# Patient Record
Sex: FEMALE | Race: WHITE | NOT HISPANIC OR LATINO | Employment: FULL TIME | URBAN - METROPOLITAN AREA
[De-identification: names, ages, dates, MRNs, and addresses within clinical notes are randomized per-mention and may not be internally consistent; named-entity substitution may affect disease eponyms.]

---

## 2018-12-06 ENCOUNTER — AMBULATORY - HEALTHEAST (OUTPATIENT)
Dept: NURSING | Facility: CLINIC | Age: 33
End: 2018-12-06

## 2019-10-31 ENCOUNTER — AMBULATORY - HEALTHEAST (OUTPATIENT)
Dept: NURSING | Facility: CLINIC | Age: 34
End: 2019-10-31

## 2020-11-13 ENCOUNTER — OFFICE VISIT - HEALTHEAST (OUTPATIENT)
Dept: FAMILY MEDICINE | Facility: CLINIC | Age: 35
End: 2020-11-13

## 2020-11-13 DIAGNOSIS — Z00.00 ROUTINE GENERAL MEDICAL EXAMINATION AT A HEALTH CARE FACILITY: ICD-10-CM

## 2020-11-13 DIAGNOSIS — M67.432 GANGLION CYST OF WRIST, LEFT: ICD-10-CM

## 2020-11-13 DIAGNOSIS — Z13.9 SCREENING FOR CONDITION: ICD-10-CM

## 2020-11-13 DIAGNOSIS — E55.9 VITAMIN D DEFICIENCY: ICD-10-CM

## 2020-11-13 DIAGNOSIS — R68.82 LOW LIBIDO: ICD-10-CM

## 2020-11-13 DIAGNOSIS — Z30.09 GENERAL COUNSELING FOR PRESCRIPTION OF ORAL CONTRACEPTIVES: ICD-10-CM

## 2020-11-13 LAB
CHOLEST SERPL-MCNC: 223 MG/DL
FASTING STATUS PATIENT QL REPORTED: YES
FASTING STATUS PATIENT QL REPORTED: YES
GLUCOSE BLD-MCNC: 87 MG/DL (ref 70–125)
HDLC SERPL-MCNC: 65 MG/DL
LDLC SERPL CALC-MCNC: 144 MG/DL
TRIGL SERPL-MCNC: 70 MG/DL

## 2020-11-13 ASSESSMENT — MIFFLIN-ST. JEOR: SCORE: 1595.94

## 2020-11-16 ENCOUNTER — COMMUNICATION - HEALTHEAST (OUTPATIENT)
Dept: FAMILY MEDICINE | Facility: CLINIC | Age: 35
End: 2020-11-16

## 2020-11-16 LAB
25(OH)D3 SERPL-MCNC: 10 NG/ML (ref 30–80)
HPV SOURCE: NORMAL
HUMAN PAPILLOMA VIRUS 16 DNA: NEGATIVE
HUMAN PAPILLOMA VIRUS 18 DNA: NEGATIVE
HUMAN PAPILLOMA VIRUS FINAL DIAGNOSIS: NORMAL
HUMAN PAPILLOMA VIRUS OTHER HR: NEGATIVE
SPECIMEN DESCRIPTION: NORMAL

## 2020-11-20 LAB
BKR LAB AP ABNORMAL BLEEDING: NO
BKR LAB AP BIRTH CONTROL/HORMONES: NORMAL
BKR LAB AP CERVICAL APPEARANCE: NORMAL
BKR LAB AP GYN ADEQUACY: NORMAL
BKR LAB AP GYN INTERPRETATION: NORMAL
BKR LAB AP HPV REFLEX: NORMAL
BKR LAB AP LMP: NORMAL
BKR LAB AP PATIENT STATUS: NORMAL
BKR LAB AP PREVIOUS ABNORMAL: NO
BKR LAB AP PREVIOUS NORMAL: 2015
HIGH RISK?: NO
PATH REPORT.COMMENTS IMP SPEC: NORMAL
RESULT FLAG (HE HISTORICAL CONVERSION): NORMAL

## 2020-12-08 ENCOUNTER — RECORDS - HEALTHEAST (OUTPATIENT)
Dept: ADMINISTRATIVE | Facility: OTHER | Age: 35
End: 2020-12-08

## 2021-01-14 ENCOUNTER — COMMUNICATION - HEALTHEAST (OUTPATIENT)
Dept: SCHEDULING | Facility: CLINIC | Age: 36
End: 2021-01-14

## 2021-01-21 ENCOUNTER — OFFICE VISIT - HEALTHEAST (OUTPATIENT)
Dept: FAMILY MEDICINE | Facility: CLINIC | Age: 36
End: 2021-01-21

## 2021-01-21 DIAGNOSIS — Z01.818 PREOPERATIVE EXAMINATION: ICD-10-CM

## 2021-01-21 DIAGNOSIS — M67.432 GANGLION CYST OF WRIST, LEFT: ICD-10-CM

## 2021-01-21 LAB — HCG UR QL: NEGATIVE

## 2021-01-21 ASSESSMENT — MIFFLIN-ST. JEOR: SCORE: 1637.69

## 2021-01-22 ENCOUNTER — RECORDS - HEALTHEAST (OUTPATIENT)
Dept: ADMINISTRATIVE | Facility: OTHER | Age: 36
End: 2021-01-22

## 2021-02-02 ENCOUNTER — RECORDS - HEALTHEAST (OUTPATIENT)
Dept: ADMINISTRATIVE | Facility: OTHER | Age: 36
End: 2021-02-02

## 2021-03-02 ENCOUNTER — RECORDS - HEALTHEAST (OUTPATIENT)
Dept: ADMINISTRATIVE | Facility: OTHER | Age: 36
End: 2021-03-02

## 2021-06-05 VITALS
HEART RATE: 88 BPM | HEIGHT: 64 IN | SYSTOLIC BLOOD PRESSURE: 124 MMHG | DIASTOLIC BLOOD PRESSURE: 68 MMHG | WEIGHT: 201.7 LBS | BODY MASS INDEX: 34.43 KG/M2

## 2021-06-05 VITALS
SYSTOLIC BLOOD PRESSURE: 116 MMHG | DIASTOLIC BLOOD PRESSURE: 76 MMHG | BODY MASS INDEX: 35.99 KG/M2 | HEIGHT: 64 IN | WEIGHT: 210.8 LBS | HEART RATE: 72 BPM

## 2021-06-13 NOTE — PROGRESS NOTES
ASSESSMENT:  1. Routine general medical examination at a health care facility     - Gynecologic Cytology (PAP Smear)  - Lipid Cascade  - Glucose    2. Vitamin D deficiency     - Vitamin D, Total (25-Hydroxy)    3. Low libido       4. Ganglion cyst of wrist, left     - Ambulatory referral to Orthopedic Surgery    5. Screening for condition     - Gynecologic Cytology (PAP Smear)  - Lipid Cascade  - Glucose           PLAN:  There are no Patient Instructions on file for this visit.    Orders Placed This Encounter   Procedures     Influenza, Seasonal Quad, PF =/> 6months     Lipid Cascade     Order Specific Question:   Fasting is required?     Answer:   Yes     Glucose     Vitamin D, Total (25-Hydroxy)     Ambulatory referral to Orthopedic Surgery     Referral Priority:   Routine     Referral Type:   Surgical     Referral Reason:   Evaluation and Treatment     Referral Location:   [facility]     Requested Specialty:   [facility]     Number of Visits Requested:   1     Medications Discontinued During This Encounter   Medication Reason     DOCOSAHEXANOIC ACID/EPA (FISH OIL ORAL) Therapy completed     lanolin (LANSINOH HPA) 100 % Oint Therapy completed     PRENATAL VIT #91/FE FUM/FA/DHA (PRENATAL + DHA ORAL) Therapy completed     witch hazel (TUCKS) 12.5-50 % PadM Therapy completed     cholecalciferol, vitamin D3, 1,000 unit tablet Therapy completed       No follow-ups on file.    Health Maintenance Due   Topic Date Due     ADVANCE CARE PLANNING  04/11/2005     PAP SMEAR  08/16/2016       CHIEF COMPLAINT:  Chief Complaint   Patient presents with     Annual Exam     bump on wrist, birth control       HISTORY OF PRESENT ILLNESS:  Lauren Mckenzie is a 35 y.o. female presenting to the clinic today for a physical exam.     She has not had a complete exam since she delivered her child.  She is not using any hormonal birth control.  She and her  are using condoms and she would like  something different.  She also has a very low libido which is causing some marital strife.  She does not have anything else symptom wise it would make me think I need to do any other work-up for the libido.    She tells me that sometimes intercourse is painful and her  has a curvature to his erection but he is not willing to go discuss what he could possibly do about that to make it more comfortable for her.  She says that she sometimes also will have some pain in the area where she had her stitches to repair the tear that she experienced during childbirth.    They are considering going to marital counseling.    She is due for a Pap smear and a flu shot and some fasting labs.    We went through options for birth control because she was interested in a Mirena IUD.  She will think about that and if she decides she wants to get that she will try to schedule it when she is menstruating so that it is easier to place.    She also has a swelling on the volar aspect of the left wrist that sometimes gets in the way if she is carrying something for work.  I will send her to a hand surgeon because this is a ganglion cyst and she can talk through options for treatment    Healthy Habits  Are you taking a daily aspirin? No  Do you typically exercising at least 40 min, 3-4 times per week?  Yes  Do you usually eat at least 4 servings of fruit and vegetables a day, include whole grains and fiber and avoid regularly eating high fat foods? Yes  Have you had an eye exam in the past two years? NO  Do you see a dentist twice per year? Yes  Do you have any concerns regarding sleep? No  Do you drink caffeine? No    Safety Screen  If you own firearms, are they secured in a locked gun cabinet or with trigger locks? The patient does not own any firearms    REVIEW OF SYSTEMS:   All other systems are negative.    Immunization History   Administered Date(s) Administered     INFLUENZA,SEASONAL QUAD, PF, =/> 6months 12/09/2015, 11/13/2020      Influenza,seasonal,quad inj =/> 6months 2018, 10/31/2019     Tdap 2015       GYNECOLOGIC HISTORY:  Last menstrual period:20  Contraception: condoms  Last Pap:  Results were: normal  Last mammogram:  n/a  Results were:      OB History        1    Para   1    Term   1       0    AB   0    Living   1       SAB   0    TAB   0    Ectopic   0    Multiple   0    Live Births   1                 PFSH:     Social History     Tobacco Use   Smoking Status Never Smoker   Smokeless Tobacco Never Used     Family History   Problem Relation Age of Onset     Spina bifida Maternal Grandmother         Has child with spinebifida     Cancer Maternal Grandmother         breast cancer     Colon cancer Maternal Uncle 50        remission     Cancer Maternal Grandfather         lung cancer; remission     Spina bifida Mother         Had child with spinabifida     Macular degeneration Mother      Hearing loss Mother      Seizures Mother      Social History     Socioeconomic History     Marital status:      Spouse name: None     Number of children: None     Years of education: None     Highest education level: None   Occupational History     None   Social Needs     Financial resource strain: None     Food insecurity     Worry: None     Inability: None     Transportation needs     Medical: None     Non-medical: None   Tobacco Use     Smoking status: Never Smoker     Smokeless tobacco: Never Used   Substance and Sexual Activity     Alcohol use: No     Drug use: No     Sexual activity: Yes     Partners: Male   Lifestyle     Physical activity     Days per week: None     Minutes per session: None     Stress: None   Relationships     Social connections     Talks on phone: None     Gets together: None     Attends [employer] service: None     Active member of club or organization: None     Attends meetings of clubs or organizations: None     Relationship status: None     Intimate partner violence     Fear of  "current or ex partner: None     Emotionally abused: None     Physically abused: None     Forced sexual activity: None   Other Topics Concern     None   Social History Narrative     None     No past surgical history on file.  No Known Allergies  Active Ambulatory Problems     Diagnosis Date Noted     Vitamin D Deficiency      Overweight 2015     Resolved Ambulatory Problems     Diagnosis Date Noted     Supervision of normal first pregnancy 2015     Excessive weight gain in pregnancy 2015     Elevated blood pressure affecting pregnancy in third trimester, antepartum 2015     Active labor at term 10/24/2015     Pregnant 10/24/2015      (normal spontaneous vaginal delivery) 10/24/2015     Past Medical History:   Diagnosis Date     Varicella        VITALS:  Vitals:    20 0809   BP: 124/68   Patient Site: Right Arm   Patient Position: Sitting   Cuff Size: Adult Regular   Pulse: 88   Weight: 201 lb 11.2 oz (91.5 kg)   Height: 5' 3.75\" (1.619 m)     BP Readings from Last 3 Encounters:   20 124/68   12/09/15 122/84   10/27/15 100/76     Wt Readings from Last 3 Encounters:   20 201 lb 11.2 oz (91.5 kg)   12/09/15 184 lb (83.5 kg)   10/24/15 203 lb 8 oz (92.3 kg)     Body mass index is 34.89 kg/m .    PHYSICAL EXAM:  General Appearance: Alert, cooperative, no distress, appears stated age  Head: Normocephalic, without obvious abnormality, atraumatic  Eyes: PERRL, conjunctiva/corneas clear, EOM's intact  Ears: Normal TM's and external ear canals, both ears  Nose: Nares normal, septum midline,mucosa normal, no drainage  Throat: Lips, mucosa, and tongue normal; teeth and gums normal  Neck: Supple, symmetrical, trachea midline, no adenopathy;  thyroid: not enlarged, symmetric, no tenderness/mass/nodules;    Back: Symmetric, no curvature, ROM normal, no CVA tenderness  Lungs: Clear to auscultation bilaterally, respirations unlabored  Breasts: No breast masses, tenderness, asymmetry, or " nipple discharge.  Heart: Regular rate and rhythm, S1 and S2 normal, no murmur, rub, or gallop, Abdomen: Soft, non-tender, bowel sounds active all four quadrants,  no masses, no organomegaly  Pelvic:Normally developed genitalia with no external lesions or eruptions. Vagina and cervix show no lesions, inflammation, discharge or tenderness. No cystocele, No rectocele. Uterus nontender.  No adnexal mass or tenderness.    Extremities: Extremities normal, atraumatic, no cyanosis or edema  Skin: Skin color, texture, turgor normal, no rashes or lesions  Lymph nodes: Cervical, supraclavicular, and axillary nodes normal  Neurologic: Normal      MEDICATIONS:  No current outpatient medications on file.     No current facility-administered medications for this visit.

## 2021-06-14 NOTE — TELEPHONE ENCOUNTER
New Appointment Needed  What is the reason for the visit:    Pre-Op Appt Request  When is the surgery? :  01/22/21  Where is the surgery?:   [facility] in [place]  Who is the surgeon? :  Dr. Kurtz  What type of surgery is being done?: cyst removal in wrist  Provider Preference: PCP only, or recommended provider  How soon do you need to be seen?: next week  Waitlist offered?: No  Okay to leave a detailed message:  Yes

## 2021-06-14 NOTE — PROGRESS NOTES
[facility]  [address] [facility] [address]  Dept: [phone]  Dept Fax: [phone]  Primary Provider: Fauzia Valerio MD  Pre-op Performing Provider: ANDRES ARMSTRONG    PREOPERATIVE EVALUATION:  Today's date: 1/21/2021    Lauren Mckenzie is a 35 y.o. female who presents for a preoperative evaluation.    Surgical Information:  Surgery/Procedure: Ganglion cyst removal (L-wrist)  Surgery Location: [facility]  Surgeon: Dr. Hernández  Surgery Date: 1/22/2021  Time of Surgery: 8 am  Where patient plans to recover: At home with family w/.  Fax number for surgical facility: [phone]    Type of Anesthesia Anticipated: to be determined    Subjective     HPI related to upcoming procedure: She has a ganglion cyst on the volar aspect of her left wrist has been giving her issues.  She tried having it aspirated at the hand surgeon's office but it came back the same day.  Plan now is to have it surgically excised    Preop Questions 1/21/2021   Have you ever had a heart attack or stroke? No   Have you ever had surgery on your heart or blood vessels, such as a stent placement, a coronary artery bypass, or surgery on an artery in your head, neck, heart, or legs? No   Do you have chest pain with activity? No   Do you have a history of  heart failure? No   Do you currently have a cold, bronchitis or symptoms of other infection? No   Do you have a cough, shortness of breath, or wheezing? No   Do you or anyone in your family have previous history of blood clots? No   Do you or does anyone in your family have a serious bleeding problem such as prolonged bleeding following surgeries or cuts? No   Have you ever had problems with anemia or been told to take iron pills? No   Have you had any abnormal blood loss such as black, tarry or bloody stools, or abnormal vaginal bleeding? No   Have you ever had a blood transfusion? No   Are you willing to have a blood transfusion if it is  medically needed before, during, or after your surgery? Yes   Have you or any of your relatives ever had problems with anesthesia? No   Do you have sleep apnea, excessive snoring or daytime drowsiness? No   Do you have any artifical heart valves or other implanted medical devices like a pacemaker, defibrillator, or continuous glucose monitor? No   Do you have artificial joints? No   Are you allergic to latex? No   Is there any chance that you may be pregnant? No     Health Care Directive:  Patient does not have a Health Care Directive or Living Will: Discussed advance care planning with patient; however, patient declined at this time.    Preoperative Review of :    reviewed - no record of controlled substances prescribed.    See problem list for active medical problems.  Problems all longstanding and stable, except as noted/documented.  See ROS for pertinent symptoms related to these conditions.      Review of Systems  CONSTITUTIONAL: NEGATIVE for fever, chills, change in weight  ENT/MOUTH: Negative for ear, mouth, and throat problems  RESP: NEGATIVE for significant cough or SOB  CV: NEGATIVE for chest pain, palpitations or peripheral edema    Patient Active Problem List    Diagnosis Date Noted     Overweight 2015     Vitamin D Deficiency      Past Medical History:   Diagnosis Date      (normal spontaneous vaginal delivery) 10/24/2015     Varicella     childhood     No past surgical history on file.  No current outpatient medications on file.     No current facility-administered medications for this visit.        No Known Allergies    Social History     Tobacco Use     Smoking status: Never Smoker     Smokeless tobacco: Never Used   Substance Use Topics     Alcohol use: No      Family History   Problem Relation Age of Onset     Spina bifida Maternal Grandmother         Has child with spinebifida     Cancer Maternal Grandmother         breast cancer     Colon cancer Maternal Uncle 50        remission      Cancer Maternal Grandfather         lung cancer; remission     Spina bifida Mother         Had child with spinabifida     Macular degeneration Mother      Hearing loss Mother      Seizures Mother      Social History     Substance and Sexual Activity   Drug Use No        Objective     LMP 01/15/2021 (Exact Date)   Physical Exam  GENERAL APPEARANCE: healthy, alert and no distress  HENT: ear canals and TM's normal and nose and mouth without ulcers or lesions  RESP: lungs clear to auscultation - no rales, rhonchi or wheezes  CV: regular rate and rhythm, normal S1 S2, no S3 or S4 and no murmur, click or rub  ABDOMEN: soft, nontender, no HSM or masses and bowel sounds normal  NEURO: Normal strength and tone, sensory exam grossly normal, mentation intact and speech normal    No results for input(s): HGB, PLT, INR, NA, K, CREATININE, HBA1C in the last 97838 hours.     PRE-OP Diagnostics:   Labs pending at this time. Results will be reviewed when available.  No EKG required for low risk surgery (cataract, skin procedure, breast biopsy, etc).    REVISED CARDIAC RISK INDEX (RCRI)   The patient has the following serious cardiovascular risks for perioperative complications:   - No serious cardiac risks = 0 points    RCRI INTERPRETATION: 0 points: Class I (very low risk - 0.4% complication rate)         Assessment & Plan      The proposed surgical procedure is considered LOW risk.    Preoperative examination  No contraindications for planned procedure.  - Pregnancy (Beta-hCG, Qual), Urine    Ganglion cyst of wrist, left  She has a ganglion cyst on the volar aspect of her left wrist has been giving her issues.  She tried having it aspirated at the hand surgeon's office but it came back the same day.  Plan now is to have it surgically excised         Risks and Recommendations:  The patient has the following additional risks and recommendations for perioperative complications:   - No identified additional risk factors other than  previously addressed    RECOMMENDATION:  APPROVAL GIVEN to proceed with proposed procedure, without further diagnostic evaluation.    Signed Electronically by: Len Goodson CNP    Copy of this evaluation report is provided to requesting physician.    [facility]    Revised Cardiac Risk Index

## 2021-06-14 NOTE — PATIENT INSTRUCTIONS - HE
Hold all supplements, aspirin and NSAIDs for 7 days prior to surgery.     Follow your surgeon's direction on when to stop eating and drinking prior to surgery.    Your surgeon will be managing your pain after your surgery.      Remove all jewelry and metal piercings before your surgery.     Remove nail polish from fingers before surgery.    If you use a CPAP machine, bring this with you to surgery.

## 2021-10-02 ENCOUNTER — HEALTH MAINTENANCE LETTER (OUTPATIENT)
Age: 36
End: 2021-10-02

## 2022-01-22 ENCOUNTER — HEALTH MAINTENANCE LETTER (OUTPATIENT)
Age: 37
End: 2022-01-22

## 2022-03-31 ENCOUNTER — OFFICE VISIT (OUTPATIENT)
Dept: FAMILY MEDICINE | Facility: CLINIC | Age: 37
End: 2022-03-31
Payer: COMMERCIAL

## 2022-03-31 VITALS
WEIGHT: 192.5 LBS | HEART RATE: 72 BPM | BODY MASS INDEX: 33.27 KG/M2 | SYSTOLIC BLOOD PRESSURE: 122 MMHG | DIASTOLIC BLOOD PRESSURE: 68 MMHG

## 2022-03-31 DIAGNOSIS — L71.0 PERIORAL DERMATITIS: Primary | ICD-10-CM

## 2022-03-31 PROCEDURE — 99213 OFFICE O/P EST LOW 20 MIN: CPT | Performed by: FAMILY MEDICINE

## 2022-03-31 RX ORDER — DOXYCYCLINE 100 MG/1
100 CAPSULE ORAL 2 TIMES DAILY
Qty: 60 CAPSULE | Refills: 1 | Status: SHIPPED | OUTPATIENT
Start: 2022-03-31 | End: 2022-04-30

## 2022-03-31 NOTE — PROGRESS NOTES
Answers for HPI/ROS submitted by the patient on 3/31/2022  How many servings of fruits and vegetables do you eat daily?: 0-1  On average, how many sweetened beverages do you drink each day (Examples: soda, juice, sweet tea, etc.  Do NOT count diet or artificially sweetened beverages)?: 0  How many minutes a day do you exercise enough to make your heart beat faster?: 9 or less  How many days a week do you exercise enough to make your heart beat faster?: 3 or less  How many days per week do you miss taking your medication?: 0  What is the reason for your visit today?: Extreme dry skin/dermatitis of face  When did your symptoms begin?: More than a month  What are your symptoms?: dry, flaky, scaly skin, stinging/burning  How would you describe these symptoms?: Moderate  Are your symptoms:: Worsening  Have you had these symptoms before?: No  Patient has had some problems with very dry facial skin for weeks and she normally was using an open up brand facial lotion did not seem to be getting any better and she had some burning on her face so then she tried CeraVe which did not seem to help either so she had a friend who was an  who told her to try hydrocortisone and thankfully she only tried that twice and it did not really cure the problem it helped a little bit temporarily.  She has not use anything else on her face and she does not have any other stigmata that might indicate hypothyroidism such as dry hair cracked nails constipation weight gain.    Objective:/68 (BP Location: Right arm, Patient Position: Sitting, Cuff Size: Adult Regular)   Pulse 72   Wt 87.3 kg (192 lb 8 oz)   BMI 33.27 kg/m    She is in no distress and she has redness and flaking skin in her nasolabial folds and around her mouth and on her chin    Assessment: Perioral dermatitis    Plan: We will try doxycycline twice a day for up to 30 days and then a refill on that.  She was warned that it will make her sun sensitive and she  should proceed accordingly with sunscreen.  If it is not getting better then we would need to do a referral for a dermatologist.

## 2022-09-03 ENCOUNTER — HEALTH MAINTENANCE LETTER (OUTPATIENT)
Age: 37
End: 2022-09-03

## 2022-10-06 ENCOUNTER — OFFICE VISIT (OUTPATIENT)
Dept: FAMILY MEDICINE | Facility: CLINIC | Age: 37
End: 2022-10-06
Payer: COMMERCIAL

## 2022-10-06 VITALS
DIASTOLIC BLOOD PRESSURE: 89 MMHG | TEMPERATURE: 98.7 F | BODY MASS INDEX: 35.43 KG/M2 | HEART RATE: 76 BPM | WEIGHT: 205 LBS | RESPIRATION RATE: 14 BRPM | SYSTOLIC BLOOD PRESSURE: 132 MMHG | OXYGEN SATURATION: 97 %

## 2022-10-06 DIAGNOSIS — B96.89 BACTERIAL CONJUNCTIVITIS OF BOTH EYES: Primary | ICD-10-CM

## 2022-10-06 DIAGNOSIS — H10.9 BACTERIAL CONJUNCTIVITIS OF BOTH EYES: Primary | ICD-10-CM

## 2022-10-06 PROCEDURE — 99213 OFFICE O/P EST LOW 20 MIN: CPT | Performed by: FAMILY MEDICINE

## 2022-10-06 RX ORDER — POLYMYXIN B SULFATE AND TRIMETHOPRIM 1; 10000 MG/ML; [USP'U]/ML
1-2 SOLUTION OPHTHALMIC EVERY 4 HOURS
Qty: 3 ML | Refills: 0 | Status: SHIPPED | OUTPATIENT
Start: 2022-10-06 | End: 2022-10-11

## 2022-10-06 NOTE — PROGRESS NOTES
Assessment:       Bacterial conjunctivitis of both eyes    - trimethoprim-polymyxin b (POLYTRIM) 13773-1.1 UNIT/ML-% ophthalmic solution  Dispense: 3 mL; Refill: 0         Plan:     Patient with bacterial conjunctivitis of both eyes.  Prescription given for Polytrim ophthalmic drops.  Recommend warm compresses to the eye several times daily.  Follow-up if symptoms getting worse or not improving.      MEDICATIONS:   Orders Placed This Encounter   Medications     trimethoprim-polymyxin b (POLYTRIM) 50244-5.1 UNIT/ML-% ophthalmic solution     Sig: Place 1-2 drops into both eyes every 4 hours for 5 days     Dispense:  3 mL     Refill:  0           Subjective:       37 year old female presents for evaluation of a 1 day history of red, itchy, irritated eyes with purulent drainage and crusting at the base of her eyelashes.  Her eyelashes were matted shut this morning when she woke up.  She has had no preceding nasal congestion and cough for the past several days.    Patient Active Problem List   Diagnosis     Vitamin D deficiency     Overweight       Past Medical History:   Diagnosis Date      (normal spontaneous vaginal delivery) 10/24/2015     Varicella     childhood       No past surgical history on file.    Current Outpatient Medications   Medication     trimethoprim-polymyxin b (POLYTRIM) 80835-1.1 UNIT/ML-% ophthalmic solution     CALCIUM ORAL     cholecalciferol, vitamin D3, (VITAMIN D3 ORAL)     No current facility-administered medications for this visit.       No Known Allergies    Family History   Problem Relation Age of Onset     Spina bifida Maternal Grandmother         Has child with spinebifida     Cancer Maternal Grandmother         breast cancer     Colon Cancer Maternal Uncle 50.00        remission     Cancer Maternal Grandfather         lung cancer; remission     Spina bifida Mother         Had child with spinabifida     Macular Degeneration Mother      Hearing Loss Mother      Seizure Disorder Mother         Social History     Socioeconomic History     Marital status:      Spouse name: None     Number of children: None     Years of education: None     Highest education level: None   Tobacco Use     Smoking status: Never Smoker     Smokeless tobacco: Never Used   Substance and Sexual Activity     Alcohol use: No     Drug use: No     Sexual activity: Yes     Partners: Male         Review of Systems  Pertinent items are noted in HPI.      Objective:                 General Appearance:    /89   Pulse 76   Temp 98.7  F (37.1  C) (Oral)   Resp 14   Wt 93 kg (205 lb)   SpO2 97%   BMI 35.43 kg/m          Alert, pleasant, cooperative, no distress, appears stated age   Head:    Normocephalic, without obvious abnormality, atraumatic   Eyes:   Conjunctiva injected and erythematous bilaterally.  There is purulent drainage noted.  Mattering noted at the base of her eyelashes.   Ears:    Normal TM's without erythema or bulging. Normal external ear canals, both ears   Nose:   Nares normal, septum midline, mucosa normal, no drainage    or sinus tenderness   Throat:   Lips, mucosa, and tongue normal; teeth and gums normal.  No tonsilar hypertrophy or exudate.   Neck:   Supple, symmetrical, trachea midline, no adenopathy    Lungs:     Clear to auscultation bilaterally without wheezes, rales, or rhonchi, respirations unlabored    Heart:    Regular rate and rhythm, S1 and S2 normal, no murmur, rub or gallop       Extremities:   Extremities normal, atraumatic, no cyanosis or edema   Skin:   Skin color, texture, turgor normal, no rashes or lesions             This note has been dictated using voice recognition software. Any grammatical or context distortions are unintentional and inherent to the software

## 2023-04-29 ENCOUNTER — HEALTH MAINTENANCE LETTER (OUTPATIENT)
Age: 38
End: 2023-04-29

## 2024-07-06 ENCOUNTER — HEALTH MAINTENANCE LETTER (OUTPATIENT)
Age: 39
End: 2024-07-06

## 2025-07-13 ENCOUNTER — HEALTH MAINTENANCE LETTER (OUTPATIENT)
Age: 40
End: 2025-07-13